# Patient Record
Sex: FEMALE | Race: WHITE | HISPANIC OR LATINO
[De-identification: names, ages, dates, MRNs, and addresses within clinical notes are randomized per-mention and may not be internally consistent; named-entity substitution may affect disease eponyms.]

---

## 2019-09-26 ENCOUNTER — APPOINTMENT (OUTPATIENT)
Dept: ORTHOPEDIC SURGERY | Facility: CLINIC | Age: 49
End: 2019-09-26
Payer: COMMERCIAL

## 2019-09-26 VITALS — BODY MASS INDEX: 19.32 KG/M2 | WEIGHT: 105 LBS | RESPIRATION RATE: 16 BRPM | HEIGHT: 62 IN

## 2019-09-26 DIAGNOSIS — Z78.9 OTHER SPECIFIED HEALTH STATUS: ICD-10-CM

## 2019-09-26 DIAGNOSIS — S69.81XA OTHER SPECIFIED INJURIES OF RIGHT WRIST, HAND AND FINGER(S), INITIAL ENCOUNTER: ICD-10-CM

## 2019-09-26 DIAGNOSIS — M65.831 OTHER SYNOVITIS AND TENOSYNOVITIS, RIGHT FOREARM: ICD-10-CM

## 2019-09-26 PROCEDURE — 99203 OFFICE O/P NEW LOW 30 MIN: CPT

## 2019-09-26 PROCEDURE — 73110 X-RAY EXAM OF WRIST: CPT | Mod: LT

## 2019-11-26 ENCOUNTER — APPOINTMENT (OUTPATIENT)
Dept: OBGYN | Facility: CLINIC | Age: 49
End: 2019-11-26

## 2020-11-19 ENCOUNTER — NON-APPOINTMENT (OUTPATIENT)
Age: 50
End: 2020-11-19

## 2021-12-02 ENCOUNTER — APPOINTMENT (OUTPATIENT)
Dept: OBGYN | Facility: CLINIC | Age: 51
End: 2021-12-02
Payer: COMMERCIAL

## 2021-12-02 ENCOUNTER — NON-APPOINTMENT (OUTPATIENT)
Age: 51
End: 2021-12-02

## 2021-12-02 VITALS
BODY MASS INDEX: 18.4 KG/M2 | HEIGHT: 62 IN | WEIGHT: 100 LBS | SYSTOLIC BLOOD PRESSURE: 112 MMHG | DIASTOLIC BLOOD PRESSURE: 68 MMHG

## 2021-12-02 DIAGNOSIS — Z83.79 FAMILY HISTORY OF OTHER DISEASES OF THE DIGESTIVE SYSTEM: ICD-10-CM

## 2021-12-02 DIAGNOSIS — Z01.419 ENCOUNTER FOR GYNECOLOGICAL EXAMINATION (GENERAL) (ROUTINE) W/OUT ABNORMAL FINDINGS: ICD-10-CM

## 2021-12-02 DIAGNOSIS — N90.89 OTHER SPECIFIED NONINFLAMMATORY DISORDERS OF VULVA AND PERINEUM: ICD-10-CM

## 2021-12-02 DIAGNOSIS — Z82.49 FAMILY HISTORY OF ISCHEMIC HEART DISEASE AND OTHER DISEASES OF THE CIRCULATORY SYSTEM: ICD-10-CM

## 2021-12-02 PROCEDURE — 99386 PREV VISIT NEW AGE 40-64: CPT

## 2021-12-02 RX ORDER — MELATONIN 3 MG
TABLET ORAL
Refills: 0 | Status: ACTIVE | COMMUNITY

## 2021-12-02 RX ORDER — PYRITHIONE ZINC 1 G/ML
SHAMPOO TOPICAL
Refills: 0 | Status: ACTIVE | COMMUNITY

## 2021-12-03 LAB
HPV HIGH+LOW RISK DNA PNL CVX: NOT DETECTED
HSV+VZV DNA SPEC QL NAA+PROBE: NOT DETECTED
SPECIMEN SOURCE: NORMAL

## 2021-12-03 NOTE — DISCUSSION/SUMMARY
[FreeTextEntry1] : Unremarkable CBE, bilateral implants noted, SBE encouraged\par Normal pelvic exam, pap collected\par bilateral open lesions adjacent to labia majora, patient states she gets dry skin and scratches\par HSV swab collected\par Hemorrhoid noted, not bothersome to patient\par  I reviewed measures for maintaining optimal bone density including dietary intake of 1200 mg calcium and 800 units  of vitamin D daily and 30 minutes of weight bearing exercise for a minimum of 3 x weekly.  Patient given a list of dietary sources of calcium and vitamin D.  Patient verbalizes understanding of these recommendations\par RTO x 1 year or prn\par

## 2021-12-03 NOTE — PHYSICAL EXAM
[Appropriately responsive] : appropriately responsive [Alert] : alert [No Acute Distress] : no acute distress [No Lymphadenopathy] : no lymphadenopathy [Regular Rate Rhythm] : regular rate rhythm [No Murmurs] : no murmurs [Clear to Auscultation B/L] : clear to auscultation bilaterally [Soft] : soft [Oriented x3] : oriented x3 [Examination Of The Breasts] : a normal appearance [] : implants [No Discharge] : no discharge [No Masses] : no breast masses were palpable [Labia Majora] : normal [Labia Minora] : normal [Normal] : normal [Uterine Adnexae] : normal [External Hemorrhoid] : external hemorrhoid [FreeTextEntry2] : small lesion on labia majora [FreeTextEntry8] : wnl

## 2021-12-03 NOTE — HISTORY OF PRESENT ILLNESS
[Patient reported mammogram was normal] : Patient reported mammogram was normal [Patient reported PAP Smear was normal] : Patient reported PAP Smear was normal [HIV test declined] : HIV test declined [Syphilis test declined] : Syphilis test declined [Gonorrhea test declined] : Gonorrhea test declined [Chlamydia test declined] : Chlamydia test declined [Trichomonas test declined] : Trichomonas test declined [LMP unknown] : LMP unknown [perimenopausal] : perimenopausal [N] : Patient does not use contraception [Monogamous (Male Partner)] : is monogamous with a male partner [Y] : Positive pregnancy history [TextBox_4] : 50 yo   presents  for annual exam.  No history of abnormal paps or mammograms.  Breast augmentation 12 years ago\par No current gyn concerns\par Sexually active and  without issues.   [Mammogramdate] : 5 years [PapSmeardate] : 5 years [TextBox_43] : scheduled for this month [LMPDate] : 9/2021 [PGHxTotal] : 2 [[facility] : [facility] [PGHxPremature] : 0 [PGHxAbortions] : 0 [PGHxABInduced] : 0 [PGHxABSpont] : 0 [PGHxEctopic] : 0 [PGHxMultBirths] : 0

## 2021-12-08 LAB — CYTOLOGY CVX/VAG DOC THIN PREP: ABNORMAL

## 2022-01-05 ENCOUNTER — APPOINTMENT (OUTPATIENT)
Dept: MAMMOGRAPHY | Facility: CLINIC | Age: 52
End: 2022-01-05

## 2023-12-21 ENCOUNTER — APPOINTMENT (OUTPATIENT)
Dept: CARDIOLOGY | Facility: CLINIC | Age: 53
End: 2023-12-21
Payer: COMMERCIAL

## 2023-12-21 ENCOUNTER — NON-APPOINTMENT (OUTPATIENT)
Age: 53
End: 2023-12-21

## 2023-12-21 VITALS
WEIGHT: 105 LBS | BODY MASS INDEX: 19.32 KG/M2 | OXYGEN SATURATION: 100 % | DIASTOLIC BLOOD PRESSURE: 60 MMHG | SYSTOLIC BLOOD PRESSURE: 96 MMHG | HEART RATE: 68 BPM | HEIGHT: 62 IN

## 2023-12-21 VITALS — DIASTOLIC BLOOD PRESSURE: 62 MMHG | SYSTOLIC BLOOD PRESSURE: 102 MMHG

## 2023-12-21 DIAGNOSIS — I38 ENDOCARDITIS, VALVE UNSPECIFIED: ICD-10-CM

## 2023-12-21 DIAGNOSIS — R07.9 CHEST PAIN, UNSPECIFIED: ICD-10-CM

## 2023-12-21 PROCEDURE — 93000 ELECTROCARDIOGRAM COMPLETE: CPT

## 2023-12-21 PROCEDURE — 99204 OFFICE O/P NEW MOD 45 MIN: CPT | Mod: 25

## 2023-12-21 RX ORDER — ALPRAZOLAM 0.25 MG/1
0.25 TABLET ORAL
Refills: 0 | Status: DISCONTINUED | COMMUNITY
End: 2023-12-21

## 2023-12-21 NOTE — HISTORY OF PRESENT ILLNESS
[FreeTextEntry1] : She has no chest pain She has no shortness of breath She has no palpitations She has no syncope She is neurologically intact She has no edema She has no skin rashes

## 2023-12-21 NOTE — REASON FOR VISIT
[FreeTextEntry1] : I saw this healthy active 53-year-old woman in cardiac consultation on  12/21/23 She has no significant past medical history, is not on any medication for any medical issues, however does have a significant family history for coronary disease, her father had bypass at the age of 59. She has some atypical symptoms but generally works out at the gym.  In the past she was told of a leaky heart valve when she had an ultrasound.  She does not know what her lipid profile is.

## 2023-12-21 NOTE — DISCUSSION/SUMMARY
[FreeTextEntry1] : 1) I scheduled a coronary CTA to evaluate for coronary disease given her father's history. 2) she will have an echo to evaluate whether she does indeed have a leaky valve, as I did not hear any murmurs. 3) she will get a lipid profile in the near future. [EKG obtained to assist in diagnosis and management of assessed problem(s)] : EKG obtained to assist in diagnosis and management of assessed problem(s)

## 2024-01-23 ENCOUNTER — APPOINTMENT (OUTPATIENT)
Dept: CARDIOLOGY | Facility: CLINIC | Age: 54
End: 2024-01-23

## 2024-02-06 ENCOUNTER — APPOINTMENT (OUTPATIENT)
Dept: CARDIOLOGY | Facility: CLINIC | Age: 54
End: 2024-02-06
Payer: COMMERCIAL

## 2024-02-06 VITALS
BODY MASS INDEX: 19.14 KG/M2 | WEIGHT: 104 LBS | OXYGEN SATURATION: 98 % | HEART RATE: 60 BPM | SYSTOLIC BLOOD PRESSURE: 120 MMHG | DIASTOLIC BLOOD PRESSURE: 80 MMHG | HEIGHT: 62 IN

## 2024-02-06 DIAGNOSIS — Q21.12 PATENT FORAMEN OVALE: ICD-10-CM

## 2024-02-06 DIAGNOSIS — Z00.00 ENCOUNTER FOR GENERAL ADULT MEDICAL EXAMINATION W/OUT ABNORMAL FINDINGS: ICD-10-CM

## 2024-02-06 DIAGNOSIS — R07.89 OTHER CHEST PAIN: ICD-10-CM

## 2024-02-06 PROCEDURE — 99215 OFFICE O/P EST HI 40 MIN: CPT

## 2024-02-06 NOTE — REASON FOR VISIT
[FreeTextEntry1] : I saw this healthy active 53-year-old woman in f/u cardiac consultation on  02/06/24 She has no significant past medical history, is not on any medication for any medical issues, however does have a significant family history for coronary disease, her father had bypass at the age of 59. She has some atypical symptoms but generally works out at the gym.  In the past she was told of a leaky heart valve when she had an ultrasound.  She does not know what her lipid profile is. She had a cardiac CTA which showed 0 calcium and 0 plaque.  It did show a PFO.

## 2024-02-06 NOTE — DISCUSSION/SUMMARY
[FreeTextEntry1] : 1) I scheduled a coronary CTA to evaluate for coronary disease given her father's history.  This was completely normal with 0 calcium and 0 plaque but there was a small PFO. 2) she will have an echo to evaluate whether she does indeed have a leaky valve, as I did not hear any murmurs. 3) she will get a lipid profile in the near future.  This was completely normal without any statin therapy